# Patient Record
Sex: MALE | Race: WHITE | NOT HISPANIC OR LATINO | Employment: UNEMPLOYED | URBAN - METROPOLITAN AREA
[De-identification: names, ages, dates, MRNs, and addresses within clinical notes are randomized per-mention and may not be internally consistent; named-entity substitution may affect disease eponyms.]

---

## 2022-02-25 ENCOUNTER — OFFICE VISIT (OUTPATIENT)
Dept: FAMILY MEDICINE | Facility: CLINIC | Age: 42
End: 2022-02-25
Payer: COMMERCIAL

## 2022-02-25 VITALS
HEART RATE: 84 BPM | SYSTOLIC BLOOD PRESSURE: 116 MMHG | HEIGHT: 66 IN | TEMPERATURE: 98.1 F | BODY MASS INDEX: 26.68 KG/M2 | OXYGEN SATURATION: 96 % | RESPIRATION RATE: 12 BRPM | WEIGHT: 166 LBS | DIASTOLIC BLOOD PRESSURE: 80 MMHG

## 2022-02-25 DIAGNOSIS — F41.9 ANXIETY: ICD-10-CM

## 2022-02-25 DIAGNOSIS — F43.21 GRIEF: Primary | ICD-10-CM

## 2022-02-25 DIAGNOSIS — F41.0 PANIC ATTACK: ICD-10-CM

## 2022-02-25 PROCEDURE — 99205 OFFICE O/P NEW HI 60 MIN: CPT | Performed by: FAMILY MEDICINE

## 2022-02-25 PROCEDURE — 96127 BRIEF EMOTIONAL/BEHAV ASSMT: CPT | Mod: 59 | Performed by: FAMILY MEDICINE

## 2022-02-25 RX ORDER — HYDROXYZINE PAMOATE 25 MG/1
25 CAPSULE ORAL
COMMUNITY
Start: 2022-02-24

## 2022-02-25 RX ORDER — HYDROXYZINE PAMOATE 25 MG/1
50 CAPSULE ORAL 3 TIMES DAILY PRN
Qty: 60 CAPSULE | Refills: 1 | Status: SHIPPED | OUTPATIENT
Start: 2022-02-25

## 2022-02-25 RX ORDER — ALPRAZOLAM 0.25 MG
0.25 TABLET ORAL
Qty: 10 TABLET | Refills: 0 | Status: SHIPPED | OUTPATIENT
Start: 2022-02-25

## 2022-02-25 RX ORDER — ESCITALOPRAM OXALATE 10 MG/1
10 TABLET ORAL DAILY
Qty: 30 TABLET | Refills: 1 | Status: SHIPPED | OUTPATIENT
Start: 2022-02-25 | End: 2022-05-26

## 2022-02-25 ASSESSMENT — ANXIETY QUESTIONNAIRES
GAD7 TOTAL SCORE: 10
4. TROUBLE RELAXING: SEVERAL DAYS
3. WORRYING TOO MUCH ABOUT DIFFERENT THINGS: NOT AT ALL
GAD7 TOTAL SCORE: 10
GAD7 TOTAL SCORE: 10
6. BECOMING EASILY ANNOYED OR IRRITABLE: SEVERAL DAYS
7. FEELING AFRAID AS IF SOMETHING AWFUL MIGHT HAPPEN: SEVERAL DAYS
1. FEELING NERVOUS, ANXIOUS, OR ON EDGE: NEARLY EVERY DAY
5. BEING SO RESTLESS THAT IT IS HARD TO SIT STILL: MORE THAN HALF THE DAYS
2. NOT BEING ABLE TO STOP OR CONTROL WORRYING: MORE THAN HALF THE DAYS
7. FEELING AFRAID AS IF SOMETHING AWFUL MIGHT HAPPEN: SEVERAL DAYS

## 2022-02-25 ASSESSMENT — PATIENT HEALTH QUESTIONNAIRE - PHQ9
SUM OF ALL RESPONSES TO PHQ QUESTIONS 1-9: 8
SUM OF ALL RESPONSES TO PHQ QUESTIONS 1-9: 8
10. IF YOU CHECKED OFF ANY PROBLEMS, HOW DIFFICULT HAVE THESE PROBLEMS MADE IT FOR YOU TO DO YOUR WORK, TAKE CARE OF THINGS AT HOME, OR GET ALONG WITH OTHER PEOPLE: SOMEWHAT DIFFICULT

## 2022-02-25 ASSESSMENT — PAIN SCALES - GENERAL: PAINLEVEL: NO PAIN (0)

## 2022-02-25 NOTE — PROGRESS NOTES
Assessment & Plan     Grief  - Adult Mental [facility]  Referral    Panic attack  Discussed patient is likely having panic attacks.  Mental health referral placed today.  We will plan to start SSRI.  He can use small dose Xanax when his symptoms are very bad, stressed that this is not a long-term solution.  He is okay to take hydroxyzine before bedtime to help him sleep.  We will plan for close follow-up in 2 weeks time.  He is to call the clinic or present to emergency department if if symptoms worsen in the interim  - Adult Mental [facility] Referral  - ALPRAZolam (XANAX) 0.25 MG tablet  Dispense: 10 tablet; Refill: 0  - hydrOXYzine (VISTARIL) 25 MG capsule  Dispense: 60 capsule; Refill: 1  - escitalopram (LEXAPRO) 10 MG tablet  Dispense: 30 tablet; Refill: 1    Anxiety  - escitalopram (LEXAPRO) 10 MG tablet  Dispense: 30 tablet; Refill: 1    I spent 50 minutes in chart review, visit with patient and documentation  Return in about 2 weeks (around 3/11/2022) for Follow up.    JEFFREY LEW, [facility]    Aixa An is a 41 year old who presents for the following health issues.  History of Present Illness       Mental Health Follow-up:  Patient presents to follow-up on Depression & Anxiety.Patient's depression since last visit has been:  No change  The patient is having other symptoms associated with depression.  Patient's anxiety since last visit has been:  Worse  The patient is having other symptoms associated with anxiety.  Any significant life events: grief or loss  Patient is feeling anxious or having panic attacks.  Patient has no concerns about alcohol or drug use.     Social History  Tobacco Use    Smoking status: Not on file    Smokeless tobacco: Not on file  Alcohol use: Not on file  Drug use: Not on file      Today's PHQ-9         PHQ-9 Total Score:     (P) 8   PHQ-9 Q9 Thoughts of better off dead/self-harm past 2 weeks :   (P) Not at all   Thoughts  "of suicide or self harm:      Self-harm Plan:        Self-harm Action:          Safety concerns for self or others:           He eats 0-1 servings of fruits and vegetables daily.He consumes 3 sweetened beverage(s) daily.He exercises with enough effort to increase his heart rate 30 to 60 minutes per day.  He exercises with enough effort to increase his heart rate 3 or less days per week.   He is taking medications regularly.     PHQ 2/25/2022   PHQ-9 Total Score 8   Q9: Thoughts of better off dead/self-harm past 2 weeks Not at all     AG-7 SCORE 2/25/2022   Total Score 10 (moderate anxiety)   Total Score 10         ED/UC Followup:    Facility:  [facility]  Date of visit: 2/25/22  Reason for visit: Chest pain, Panic attack  Current Status: Hasn't slept a good night for weeks.  Mother passed away last month.  Beginning of February woke up gasping for air, and hasn't been the same since and is scared to go to bed.  Feels it is all anxiety related.  Took hydroxyzine last night that was prescribed by the ER, made him drowsy, but when he went to bed was wide awake.     Has had extreme anxiety since his mother passed away.  For the last few weeks has had intermittent episodes of feeling of impending doom, feels like he is going to die from heart attack.,  Episodes seem to be worse at bedtime.  Was evaluated emergency room yesterday, normal EKG, troponin and has scheduled stress echo for later today.    Denies thought of harming himself or others.    Review of Systems   Constitutional, HEENT, cardiovascular, pulmonary, gi and gu systems are negative, except as otherwise noted.      Objective    /80 (BP Location: Right arm, Patient Position: Chair, Cuff Size: Adult Regular)   Pulse 84   Temp 98.1  F (36.7  C) (Tympanic)   Resp 12   Ht 1.676 m (5' 6\")   Wt 75.3 kg (166 lb)   SpO2 96%   BMI 26.79 kg/m    Body mass index is 26.79 kg/m .  Physical Exam  Constitutional:       General: He is not in acute " distress.     Appearance: He is well-developed.   HENT:      Right Ear: Tympanic membrane and external ear normal.      Left Ear: Tympanic membrane and external ear normal.      Nose: Nose normal.      Mouth/Throat:      Mouth: No oral lesions.      Pharynx: No oropharyngeal exudate.   Eyes:      General:         Right eye: No discharge.         Left eye: No discharge.      Conjunctiva/sclera: Conjunctivae normal.      Pupils: Pupils are equal, round, and reactive to light.   Neck:      Thyroid: No thyromegaly.      Trachea: No tracheal deviation.   Cardiovascular:      Rate and Rhythm: Normal rate and regular rhythm.      Pulses: Normal pulses.      Heart sounds: Normal heart sounds, S1 normal and S2 normal. No murmur heard.    No S3 or S4 sounds.   Pulmonary:      Effort: Pulmonary effort is normal. No respiratory distress.      Breath sounds: Normal breath sounds. No wheezing or rales.   Abdominal:      General: Bowel sounds are normal.      Palpations: Abdomen is soft. There is no mass.      Tenderness: There is no abdominal tenderness.   Musculoskeletal:         General: No deformity. Normal range of motion.      Cervical back: Neck supple.   Lymphadenopathy:      Cervical: No cervical adenopathy.   Skin:     General: Skin is warm and dry.      Findings: No lesion or rash.   Neurological:      Mental Status: He is alert and oriented to person, place, and time.      Motor: No abnormal muscle tone.      Deep Tendon Reflexes: Reflexes are normal and symmetric.   Psychiatric:         Speech: Speech normal.         Thought Content: Thought content normal.         Judgment: Judgment normal.

## 2022-02-26 ASSESSMENT — ANXIETY QUESTIONNAIRES: GAD7 TOTAL SCORE: 10

## 2022-02-26 ASSESSMENT — PATIENT HEALTH QUESTIONNAIRE - PHQ9: SUM OF ALL RESPONSES TO PHQ QUESTIONS 1-9: 8

## 2022-05-26 ENCOUNTER — VIRTUAL VISIT (OUTPATIENT)
Dept: FAMILY MEDICINE | Facility: CLINIC | Age: 42
End: 2022-05-26
Payer: COMMERCIAL

## 2022-05-26 DIAGNOSIS — F41.9 ANXIETY: ICD-10-CM

## 2022-05-26 DIAGNOSIS — F41.0 PANIC ATTACK: ICD-10-CM

## 2022-05-26 PROCEDURE — 96127 BRIEF EMOTIONAL/BEHAV ASSMT: CPT | Performed by: FAMILY MEDICINE

## 2022-05-26 PROCEDURE — 99213 OFFICE O/P EST LOW 20 MIN: CPT | Mod: TEL | Performed by: FAMILY MEDICINE

## 2022-05-26 RX ORDER — ESCITALOPRAM OXALATE 10 MG/1
10 TABLET ORAL DAILY
Qty: 30 TABLET | Refills: 11 | Status: SHIPPED | OUTPATIENT
Start: 2022-05-26

## 2022-05-26 ASSESSMENT — ANXIETY QUESTIONNAIRES
3. WORRYING TOO MUCH ABOUT DIFFERENT THINGS: SEVERAL DAYS
6. BECOMING EASILY ANNOYED OR IRRITABLE: NOT AT ALL
GAD7 TOTAL SCORE: 6
GAD7 TOTAL SCORE: 6
7. FEELING AFRAID AS IF SOMETHING AWFUL MIGHT HAPPEN: NOT AT ALL
2. NOT BEING ABLE TO STOP OR CONTROL WORRYING: SEVERAL DAYS
5. BEING SO RESTLESS THAT IT IS HARD TO SIT STILL: NEARLY EVERY DAY
1. FEELING NERVOUS, ANXIOUS, OR ON EDGE: SEVERAL DAYS

## 2022-05-26 ASSESSMENT — PATIENT HEALTH QUESTIONNAIRE - PHQ9
5. POOR APPETITE OR OVEREATING: NOT AT ALL
SUM OF ALL RESPONSES TO PHQ QUESTIONS 1-9: 10

## 2022-05-26 NOTE — COMMUNITY RESOURCES LIST (ENGLISH)
05/26/2022   [facility] - Outpatient Clinics  Palmira Bo  For questions about this resource list or additional care needs, please contact your primary care clinic or care manager.  Phone: [phone]   Email: N/A   Address: [address] [place] [id number]   Hours: N/A        Hotlines and Helplines       Hotline - Crisis help  1  [place] - Behavioral [facility] Department Distance: 20.19 miles      COVID-19 Status: Phone/Virtual   [address] [place] [address]  Language: English  Hours: Mon - Sun Open 24 Hours   Phone: (664) 296-3872 Website: https://www.[facility].org/     2  [facility] - Mobile Crisis Response Distance: 21.57 miles      COVID-19 Status: Phone/Virtual   [address] [place] [address]  Language: English  Hours: Mon - Sun Open 24 Hours  Fees: Insurance   Phone: (665) 429-7930 Email: info@[employer].valuescope Website: https://[email or web address].valuescope/location/xzneazrld-frcmel-vxhyfu-services-only/          Mental Health       Individual counseling  3  [facility] [place] [facility] Distance: 0.22 miles      COVID-19 Status: Regular Operations, COVID-19 Status: Phone/Virtual   [address] [place] [address]  Language: English  Hours: Mon - Fri 8:00 AM - 8:00 PM  Fees: Insurance, Self Pay   Phone: (661) 786-8958 Website: http://www.[employer].morteza     4  [facility] ([facility]) Distance: 8.88 miles      COVID-19 Status: Regular Operations, COVID-19 Status: Phone/Virtual   [address] [place] [address]  Language: English  Hours: Mon - Fri Appt. Only  Fees: Insurance, Self Pay, Sliding Fee   Phone: (707) 124-6090 Email: @[employer] Website: https://[employer].org/     Mental health crisis care  5  [facility] - Crisis Assessment and Stabilization Services Distance: 10.86 miles      COVID-19 Status:  Regular Operations, COVID-19 Status: Phone/Virtual   [address] [place] [address]  Language: English  Hours: Mon - Sun Open 24 Hours  Fees: Insurance, Self Pay, Sliding Fee   Phone: (530) 214-9473 Email: info@[employer] Website: https://www.[employer]/location/[facility]/     Mental health support group  6  [facility] Distance: 23.59 miles      COVID-19 Status: Service Unavailable   [address] [place] [address]  Language: English  Hours: Wed 1:00 PM - 3:00 PM  Fees: Insurance, Self Pay   Phone: (276) 343-7199 Email: familymeans@[employer] Website: https://www.[employer].Arkeo/          Important Numbers & Websites       Emergency Services   911  [facility]   311  Poison Control   (294) 202-6079  Suicide Prevention Lifeline   (996) 135-4325 (TALK)  Child Abuse Hotline   (227) 856-3810 (4-A-Child)  Sexual Assault Hotline   (523) 548-8906 (HOPE)  National Runaway Safeline   (189) 517-9289 (RUNAWAY)  All-Options Talkline   (989) 733-2818  Substance Abuse Referral   (273) 277-9192 (HELP)

## 2022-05-26 NOTE — PROGRESS NOTES
Juve is a 41 year old who is being evaluated via a billable telephone visit.      What phone number would you like to be contacted at? [phone]  How would you like to obtain your AVS? Mail a copy    Assessment & Plan     Panic attack  Symptoms much improved while on Lexapro. Discussed that with symptom improvement we will plan to keep on same dose today, discussed that if need be in the future we can consider increasing dose vs medication change. Encouraged to reach back out if symptoms are worsening.   - escitalopram (LEXAPRO) 10 MG tablet  Dispense: 30 tablet; Refill: 11    Anxiety  - escitalopram (LEXAPRO) 10 MG tablet  Dispense: 30 tablet; Refill: 11      Depression Screening Follow Up    PHQ 5/26/2022   PHQ-9 Total Score 10   Q9: Thoughts of better off dead/self-harm past 2 weeks Not at all       Return in about 3 months (around 8/26/2022) for Routine preventive.    JEFFREY LEW, [facility]   Juve is a 41 year old who presents for the following health issuesHPI     Anxiety Follow-Up    How are you doing with your anxiety since your last visit? Was doing well, ran out of medication and could tell a difference    Are you having other symptoms that might be associated with anxiety? No    Have you had a significant life event? No     Are you feeling depressed? No    Do you have any concerns with your use of alcohol or other drugs? No    Social History     Tobacco Use     Smoking status: Current Every Day Smoker     Packs/day: 1.00     Smokeless tobacco: Never Used   Vaping Use     Vaping Use: Never used   Substance Use Topics     Alcohol use: Not Currently     Drug use: Never     AG-7 SCORE 2/25/2022 5/26/2022   Total Score 10 (moderate anxiety) -   Total Score 10 6     PHQ 2/25/2022 5/26/2022   PHQ-9 Total Score 8 10   Q9: Thoughts of better off dead/self-harm past 2 weeks Not at all Not at all           How many servings of fruits and vegetables do you eat  daily?  0-1    On average, how many sweetened beverages do you drink each day (Examples: soda, juice, sweet tea, etc.  Do NOT count diet or artificially sweetened beverages)?   2    How many days per week do you exercise enough to make your heart beat faster? 3 or less    How many minutes a day do you exercise enough to make your heart beat faster? 10 - 19    How many days per week do you miss taking your medication? 0    Ran out of meds about a 1.5 weeks ago.    While taking Lexapro his mood significantly improved. He was sleeping much better and his panic attacks nearly dissipated. Work is going well. He reports improved appetite while on medication. Denies side effects.     Review of Systems   Constitutional, HEENT, cardiovascular, pulmonary, gi and gu systems are negative, except as otherwise noted.      Objective           Vitals:  No vitals were obtained today due to virtual visit.    Physical Exam   RESP: No cough, no audible wheezing, able to talk in full sentences  Remainder of exam unable to be completed due to telephone visits        Phone call duration: 10 minutes